# Patient Record
Sex: FEMALE | Race: WHITE | NOT HISPANIC OR LATINO | Employment: OTHER | URBAN - METROPOLITAN AREA
[De-identification: names, ages, dates, MRNs, and addresses within clinical notes are randomized per-mention and may not be internally consistent; named-entity substitution may affect disease eponyms.]

---

## 2023-04-04 ENCOUNTER — OFFICE VISIT (OUTPATIENT)
Dept: PRIMARY CARE | Facility: CLINIC | Age: 78
End: 2023-04-04
Payer: MEDICARE

## 2023-04-04 VITALS
SYSTOLIC BLOOD PRESSURE: 108 MMHG | HEIGHT: 59 IN | BODY MASS INDEX: 29.43 KG/M2 | WEIGHT: 146 LBS | HEART RATE: 56 BPM | TEMPERATURE: 97.8 F | DIASTOLIC BLOOD PRESSURE: 68 MMHG | OXYGEN SATURATION: 98 %

## 2023-04-04 DIAGNOSIS — M17.12 OSTEOARTHRITIS OF LEFT KNEE, UNSPECIFIED OSTEOARTHRITIS TYPE: Primary | ICD-10-CM

## 2023-04-04 PROCEDURE — 1036F TOBACCO NON-USER: CPT | Performed by: FAMILY MEDICINE

## 2023-04-04 PROCEDURE — 99214 OFFICE O/P EST MOD 30 MIN: CPT | Performed by: FAMILY MEDICINE

## 2023-04-04 ASSESSMENT — PATIENT HEALTH QUESTIONNAIRE - PHQ9
SUM OF ALL RESPONSES TO PHQ9 QUESTIONS 1 AND 2: 0
1. LITTLE INTEREST OR PLEASURE IN DOING THINGS: NOT AT ALL
2. FEELING DOWN, DEPRESSED OR HOPELESS: NOT AT ALL

## 2023-04-04 ASSESSMENT — LIFESTYLE VARIABLES
HOW OFTEN DO YOU HAVE A DRINK CONTAINING ALCOHOL: 2-4 TIMES A MONTH
HOW OFTEN DO YOU HAVE A DRINK CONTAINING ALCOHOL: NEVER

## 2023-04-04 ASSESSMENT — ENCOUNTER SYMPTOMS
ARTHRALGIAS: 1
LOSS OF SENSATION IN FEET: 0
DEPRESSION: 0
OCCASIONAL FEELINGS OF UNSTEADINESS: 0

## 2023-04-04 NOTE — PROGRESS NOTES
"Subjective   Patient ID: Leonila Andres is a 77 y.o. female who presents for Follow-up (Pt presents today for medical surgery clearance).    Here for medical clearance for left total knee replacement.         Review of Systems   Musculoskeletal:  Positive for arthralgias.   All other systems reviewed and are negative.      Objective   /68   Pulse 56   Temp 36.6 °C (97.8 °F)   Ht 1.499 m (4' 11\")   Wt 66.2 kg (146 lb)   SpO2 98%   BMI 29.49 kg/m²     Physical Exam  Vitals and nursing note reviewed.   Constitutional:       Appearance: She is obese.   HENT:      Head: Normocephalic and atraumatic.      Nose: Nose normal.      Mouth/Throat:      Mouth: Mucous membranes are moist.      Pharynx: Oropharynx is clear.   Eyes:      Extraocular Movements: Extraocular movements intact.      Conjunctiva/sclera: Conjunctivae normal.      Pupils: Pupils are equal, round, and reactive to light.   Cardiovascular:      Rate and Rhythm: Normal rate and regular rhythm.      Pulses: Normal pulses.   Pulmonary:      Effort: Pulmonary effort is normal.      Breath sounds: Normal breath sounds.   Abdominal:      General: Bowel sounds are normal.      Palpations: Abdomen is soft.   Musculoskeletal:         General: Tenderness present. Normal range of motion.      Cervical back: Normal range of motion and neck supple.   Skin:     General: Skin is warm and dry.      Capillary Refill: Capillary refill takes less than 2 seconds.   Neurological:      General: No focal deficit present.      Mental Status: She is alert.   Psychiatric:         Mood and Affect: Mood normal.         Behavior: Behavior normal.         Thought Content: Thought content normal.         Judgment: Judgment normal.         Assessment/Plan Medically cleared for left total knee replacement surgery  Problem List Items Addressed This Visit    None  Visit Diagnoses       Osteoarthritis of left knee, unspecified osteoarthritis type    -  Primary               "

## 2023-05-16 ENCOUNTER — HOSPITAL ENCOUNTER (OUTPATIENT)
Dept: DATA CONVERSION | Facility: HOSPITAL | Age: 78
End: 2023-05-17
Attending: ORTHOPAEDIC SURGERY | Admitting: ORTHOPAEDIC SURGERY
Payer: COMMERCIAL

## 2023-05-16 DIAGNOSIS — M17.12 UNILATERAL PRIMARY OSTEOARTHRITIS, LEFT KNEE: ICD-10-CM

## 2023-05-16 DIAGNOSIS — E78.5 HYPERLIPIDEMIA, UNSPECIFIED: ICD-10-CM

## 2023-05-17 LAB
ANION GAP IN SER/PLAS: 14 MMOL/L (ref 10–20)
BASOPHILS (10*3/UL) IN BLOOD BY AUTOMATED COUNT: 0.01 X10E9/L (ref 0–0.1)
BASOPHILS/100 LEUKOCYTES IN BLOOD BY AUTOMATED COUNT: 0.1 % (ref 0–2)
CALCIUM (MG/DL) IN SER/PLAS: 8.2 MG/DL (ref 8.6–10.3)
CARBON DIOXIDE, TOTAL (MMOL/L) IN SER/PLAS: 25 MMOL/L (ref 21–32)
CHLORIDE (MMOL/L) IN SER/PLAS: 99 MMOL/L (ref 98–107)
CREATININE (MG/DL) IN SER/PLAS: 0.89 MG/DL (ref 0.5–1.05)
EOSINOPHILS (10*3/UL) IN BLOOD BY AUTOMATED COUNT: 0.01 X10E9/L (ref 0–0.4)
EOSINOPHILS/100 LEUKOCYTES IN BLOOD BY AUTOMATED COUNT: 0.1 % (ref 0–6)
ERYTHROCYTE DISTRIBUTION WIDTH (RATIO) BY AUTOMATED COUNT: 12.8 % (ref 11.5–14.5)
ERYTHROCYTE MEAN CORPUSCULAR HEMOGLOBIN CONCENTRATION (G/DL) BY AUTOMATED: 31.8 G/DL (ref 32–36)
ERYTHROCYTE MEAN CORPUSCULAR VOLUME (FL) BY AUTOMATED COUNT: 90 FL (ref 80–100)
ERYTHROCYTES (10*6/UL) IN BLOOD BY AUTOMATED COUNT: 4.11 X10E12/L (ref 4–5.2)
GFR FEMALE: 67 ML/MIN/1.73M2
GLUCOSE (MG/DL) IN SER/PLAS: 134 MG/DL (ref 74–99)
HEMATOCRIT (%) IN BLOOD BY AUTOMATED COUNT: 37.1 % (ref 36–46)
HEMOGLOBIN (G/DL) IN BLOOD: 11.8 G/DL (ref 12–16)
IMMATURE GRANULOCYTES/100 LEUKOCYTES IN BLOOD BY AUTOMATED COUNT: 0.4 % (ref 0–0.9)
LEUKOCYTES (10*3/UL) IN BLOOD BY AUTOMATED COUNT: 10.8 X10E9/L (ref 4.4–11.3)
LYMPHOCYTES (10*3/UL) IN BLOOD BY AUTOMATED COUNT: 1.45 X10E9/L (ref 0.8–3)
LYMPHOCYTES/100 LEUKOCYTES IN BLOOD BY AUTOMATED COUNT: 13.5 % (ref 13–44)
MONOCYTES (10*3/UL) IN BLOOD BY AUTOMATED COUNT: 0.89 X10E9/L (ref 0.05–0.8)
MONOCYTES/100 LEUKOCYTES IN BLOOD BY AUTOMATED COUNT: 8.3 % (ref 2–10)
NEUTROPHILS (10*3/UL) IN BLOOD BY AUTOMATED COUNT: 8.36 X10E9/L (ref 1.6–5.5)
NEUTROPHILS/100 LEUKOCYTES IN BLOOD BY AUTOMATED COUNT: 77.6 % (ref 40–80)
NRBC (PER 100 WBCS) BY AUTOMATED COUNT: 0 /100 WBC (ref 0–0)
PLATELETS (10*3/UL) IN BLOOD AUTOMATED COUNT: 205 X10E9/L (ref 150–450)
POTASSIUM (MMOL/L) IN SER/PLAS: 4 MMOL/L (ref 3.5–5.3)
SODIUM (MMOL/L) IN SER/PLAS: 134 MMOL/L (ref 136–145)
UREA NITROGEN (MG/DL) IN SER/PLAS: 10 MG/DL (ref 6–23)

## 2023-06-12 LAB
COMPLETE PATHOLOGY REPORT: NORMAL
CONVERTED CLINICAL DIAGNOSIS-HISTORY: NORMAL
CONVERTED FINAL DIAGNOSIS: NORMAL
CONVERTED FINAL REPORT PDF LINK TO COPY AND PASTE: NORMAL
CONVERTED GROSS DESCRIPTION: NORMAL

## 2023-09-07 VITALS — WEIGHT: 145.94 LBS | BODY MASS INDEX: 29.42 KG/M2 | HEIGHT: 59 IN

## 2023-09-30 NOTE — H&P
History & Physical Reviewed:   I have reviewed the History and Physical dated:  05-May-2023   History and Physical reviewed and relevant findings noted. Patient examined to review pertinent physical  findings.: No significant changes   Home Medications Reviewed: no changes noted   Allergies Reviewed: no changes noted       ERAS (Enhanced Recovery After Surgery):  ·  ERAS Patient: no     Consent:   COVID-19 Consent:  ·  COVID-19 Risk Consent Surgeon has reviewed key risks related to the risk of dick COVID-19 and if they contract COVID-19 what the risks are.       Electronic Signatures:  Ruben Ontiveros)  (Signed 16-May-2023 04:31)   Authored: History & Physical Reviewed, ERAS, Consent,  Note Completion      Last Updated: 16-May-2023 04:31 by Ruben Ontiveros)

## 2023-09-30 NOTE — DISCHARGE SUMMARY
Send Summary:   Discharge Summary Providers:  Provider Role Provider Name   · Attending Ruben Ontiveros   · Referring Ruben Ontiveros   · Primary Scottie Marlow       Note Recipients: Scottie Marlow MD       Discharge:    Summary:   Admission Date: .16-May-2023 06:03:00   Discharge Date: 17-May-2023   Attending Physician at Discharge: Ruben Ontiveros MD   Admission Reason: Left knee arthritis, status post  left total knee replacement   Final Discharge Diagnoses: Arthritis of knee, left   Procedures: Date: 16-May-2023 09:42:00  Procedure Name: 1. LEFT TOTAL KNEE REPLACEMENT   Vital Signs:        T   P  R  BP   MAP  SpO2   Value  36.4  80  18  120/68   87  93%  Date/Time 5/17 7:17 5/17 7:17 5/17 7:17 5/17 7:17 5/17 7:17 5/17 7:17  Range  (35.7C - 36.4C )  (72 - 88 )  (18 - 18 )  (105 - 141 )/ (62 - 73 )  (87 - 91 )  (91% - 96% )    Date:            Weight/Scale Type:  Height:   16-May-2023 11:21  66.2  kg         149.8  cm  Physical Exam:    Patient is in no acute distress.  Exam of her left knee reveals she has mild bloody shadow at the bandage.  She has no left calf tenderness.  She has adequate strength  with resisted plantarflexion dorsiflexion of her left foot and ankle.  She has weak quadriceps function which is expected at this time, especially following an adductor canal block.  Hospital Course:    Patient is a 77-year-old female had significant left knee pain secondary to left knee arthritis.  On May 16th 2023 she underwent elective total knee arthroplasty.   She tolerated procedure without any complications.  She was admitted to orthopedic floor afterwards.  She was eval by physical therapy and cleared to be discharged home on postoperative day 1.  She is discharged home on aspirin twice a day for DVT prophylaxis.   She will follow-up with orthopedic surgery in 1 month for her left total knee replacement.      Discharge Information:    and Continuing Care:   Lab Results - Pending:    Surgical Pathology Drawn  at 16-May-2023 08:45:00  Radiology Results - Pending: None   Discharge Instructions:    Activity:           activity as tolerated   with walker.          May shower..            May not drive.            Weight-bearing Instructions: weight-bearing as tolerated.      Nutrition/Diet:           resume normal diet          Encourage Fluids:   Increase your water and fiber intake to help prevent constipation    Wound Care:           Wound Site:   left knee          Change Dressing:   remove dressing in one week          Cover With:   no dressing, leave open to air,  if no drainage noted    Additional Orders:           Additional Instructions:   .  Continue to apply ice to incision 4 to 5 times a day for 20 minutes    Take over the counter stool softener while you are taking pain medications.  Stop stool softener if you experience diarrhea.    Do NOT take any non steroid anti-inflammatory medications (NSAIDS) such as ibuprofen, aleve, or naproxen.    Wear bilateral karyna hose compression stockings to lower legs for next 4 to 6 weeks      Home Care Certification:           Home Care Agency:    [facility] [phone]          Skilled Disciplines Ordered:   RN/LPN,  PT    Home Care Services:           Home Care Skilled Service:   Rehab (PT/OT/SP eval and treat),  wound care    Follow Up Appointments:    Follow-Up Appointment 01:           Physician/Dept/Service:   Dr Ontiveros          Call to Schedule in:   4 weeks          Phone Number:   [phone]    Discharge Medications: Home Medication   Ocuvite oral tablet - 1 tab(s) orally once a day  Tylenol 500 mg oral tablet - 2 tab(s) orally every 8 hours  aspirin 325 mg oral delayed release tablet - 1 tab(s) orally 2 times a day x 30 days  Colace 100 mg oral capsule - 1 cap(s) orally once a day  traMADol 50 mg oral tablet - 1 tab(s) orally every 4 to 6 hours x 7 days as needed for moderate pain G89.18  oxyCODONE 5 mg oral tablet - 1 tab(s) orally every 4 hours x 7 days as  needed for severe incisional pain G89.18  Calcium + D3 - 1 tab(s) oral once a day  simvastatin 40 mg oral tablet - 1 tab(s) oral once a day     PRN Medication   MiraLax oral powder for reconstitution - orally 2 times a day, As Needed for constipation  take dose if you do NOT have bowel movement by Saturday May 20th     DNR Status:   ·  Code Status Code Status order at time of discharge: Full Code       Electronic Signatures:  Ruben Ontiveros)  (Signed 17-May-2023 08:20)   Authored: Send Summary, Summary Content, Ongoing Care,  DNR Status, Note Completion      Last Updated: 17-May-2023 08:20 by Ruben Ontiveros)

## 2023-09-30 NOTE — PROGRESS NOTES
Service: Orthopaedics     Subjective Data:   NOEMI HORTON is a 77 year old Female who is Hospital Day # 2 and POD #1 for 1. LEFT TOTAL KNEE REPLACEMENT;    Additional Information:    Patient is postoperative day #1 following a left total knee arthroplasty.  She still has moderate pain.  But it is under better control this morning.  She denies  numbness and paresthesias in her left lower extremity.  She denies any calf pain.  She denies chest pain and shortness of breath.  She did have nausea which has resolved.    Objective Data:     Objective Information:      T   P  R  BP   MAP  SpO2   Value  36.4  80  18  120/68   87  93%  Date/Time 5/17 7:17 5/17 7:17 5/17 7:17 5/17 7:17 5/17 7:17 5/17 7:17  Range  (35.7C - 36.4C )  (72 - 88 )  (18 - 18 )  (105 - 141 )/ (62 - 73 )  (87 - 91 )  (91% - 96% )      Pain reported at 5/16 22:00: sleeping    ---- Intake and Output  -----  Mn/Dy/Year Time  Intake   Output  Net  May 17, 2023 6:00 am  0   450  -450  May 16, 2023 10:00 pm  480   500  -20  May 16, 2023 2:00 pm  0   0  0    The Intake and Output Totals for the last 24 hours are:      Intake   Output  Net      480   950  -470    Physical Exam Narrative:  ·  Physical Exam:    Patient is in no acute distress.  She is sitting up at bedside.  Her left knee bandage has mild bloody shadow.  She has no left calf tenderness.  She has adequate  strength with resisted plantarflexion and dorsiflexion of her left foot and ankle.  She does have weakness with left knee extension which is expected.    Recent Lab Results:    Results:        I have reviewed these laboratory results:    Complete Blood Count + Differential  17-May-2023 06:16:00      Result Value    White Blood Cell Count  10.8    Nucleated Erythrocyte Count  0.0    Red Blood Cell Count  4.11    HGB  11.8   L   HCT  37.1    MCV  90    MCHC  31.8   L   PLT  205    RDW-CV  12.8    Neutrophil %  77.6    Immature Granulocytes %  0.4    Lymphocyte %  13.5    Monocyte %   8.3    Eosinophil %  0.1    Basophil %  0.1    Neutrophil Count  8.36   H   Lymphocyte Count  1.45    Monocyte Count  0.89   H   Eosinophil Count  0.01    Basophil Count  0.01        Assessment and Plan:        Additional Dx:   Arthritis of knee, left: Entered Date: 16-May-2023 09:51    Code Status:  ·  Code Status Full Code     Assessment:    Patient is doing satisfactory postop day 1 following her left total knee replacement.  She will be mobilized with physical therapy.  She will likely be discharged  home later today on aspirin twice a day for DVT prophylaxis.  She will follow-up with orthopedic surgery in 1 month.      Electronic Signatures:  Ruben Ontiveros)  (Signed 17-May-2023 07:59)   Authored: Service, Subjective Data, Objective Data, Assessment  and Plan, Note Completion      Last Updated: 17-May-2023 07:59 by Ruben Ontiveros)

## 2023-10-02 NOTE — OP NOTE
PROCEDURE DETAILS    Preoperative Diagnosis:  Osteoarthritis of left knee, M17.12    Postoperative Diagnosis:  Osteoarthritis of left knee, M17.12    Surgeon: Ruben Ontiveros  Resident/Fellow/Other Assistant: Demarcus Murphy    Procedure:  1. LEFT TOTAL KNEE REPLACEMENT    Anesthesia: No anesthesiologist associated with this case  Estimated Blood Loss: 150mL  Blood Replaced: none  Findings: severe DJD left knee  Specimens(s) Collected: no,     Complications: none  Implants: DePuy Attune TKA  Tourniquet Times: 68 minutes at 300mmHg on left thigh  Patient Returned To/Condition: Stable    Date of Dictation: 16-May-2023  Dictated By: Ruben Ontiveros MD  Dictation Job Number: [id number]                            Attestation:   Note Completion:  Attending Attestation I performed the procedure without a resident         Electronic Signatures:  Ruben Ontiveros)  (Signed 16-May-2023 09:50)   Authored: Post-Operative Note, Chart Review, Note Completion      Last Updated: 16-May-2023 09:50 by Ruben Ontiveros)

## 2023-11-16 ENCOUNTER — OFFICE VISIT (OUTPATIENT)
Dept: PRIMARY CARE | Facility: CLINIC | Age: 78
End: 2023-11-16
Payer: COMMERCIAL

## 2023-11-16 VITALS
WEIGHT: 134 LBS | DIASTOLIC BLOOD PRESSURE: 66 MMHG | OXYGEN SATURATION: 96 % | BODY MASS INDEX: 27.01 KG/M2 | HEART RATE: 63 BPM | SYSTOLIC BLOOD PRESSURE: 104 MMHG | TEMPERATURE: 96.9 F | HEIGHT: 59 IN

## 2023-11-16 DIAGNOSIS — Z00.00 ROUTINE GENERAL MEDICAL EXAMINATION AT HEALTH CARE FACILITY: Primary | ICD-10-CM

## 2023-11-16 DIAGNOSIS — Z00.00 MEDICARE ANNUAL WELLNESS VISIT, SUBSEQUENT: ICD-10-CM

## 2023-11-16 DIAGNOSIS — E78.2 MIXED HYPERLIPIDEMIA: ICD-10-CM

## 2023-11-16 PROCEDURE — 1159F MED LIST DOCD IN RCRD: CPT | Performed by: FAMILY MEDICINE

## 2023-11-16 PROCEDURE — G0439 PPPS, SUBSEQ VISIT: HCPCS | Performed by: FAMILY MEDICINE

## 2023-11-16 PROCEDURE — 1036F TOBACCO NON-USER: CPT | Performed by: FAMILY MEDICINE

## 2023-11-16 PROCEDURE — 1160F RVW MEDS BY RX/DR IN RCRD: CPT | Performed by: FAMILY MEDICINE

## 2023-11-16 PROCEDURE — 1170F FXNL STATUS ASSESSED: CPT | Performed by: FAMILY MEDICINE

## 2023-11-16 RX ORDER — SIMVASTATIN 40 MG/1
40 TABLET, FILM COATED ORAL DAILY
COMMUNITY
End: 2023-11-21 | Stop reason: SDUPTHER

## 2023-11-16 ASSESSMENT — PATIENT HEALTH QUESTIONNAIRE - PHQ9
2. FEELING DOWN, DEPRESSED OR HOPELESS: NOT AT ALL
2. FEELING DOWN, DEPRESSED OR HOPELESS: NOT AT ALL
SUM OF ALL RESPONSES TO PHQ9 QUESTIONS 1 AND 2: 0
SUM OF ALL RESPONSES TO PHQ9 QUESTIONS 1 AND 2: 0
1. LITTLE INTEREST OR PLEASURE IN DOING THINGS: NOT AT ALL
1. LITTLE INTEREST OR PLEASURE IN DOING THINGS: NOT AT ALL

## 2023-11-16 ASSESSMENT — COLUMBIA-SUICIDE SEVERITY RATING SCALE - C-SSRS
1. IN THE PAST MONTH, HAVE YOU WISHED YOU WERE DEAD OR WISHED YOU COULD GO TO SLEEP AND NOT WAKE UP?: NO
2. HAVE YOU ACTUALLY HAD ANY THOUGHTS OF KILLING YOURSELF?: NO
6. HAVE YOU EVER DONE ANYTHING, STARTED TO DO ANYTHING, OR PREPARED TO DO ANYTHING TO END YOUR LIFE?: NO

## 2023-11-16 ASSESSMENT — ACTIVITIES OF DAILY LIVING (ADL)
DOING_HOUSEWORK: INDEPENDENT
BATHING: INDEPENDENT
GROCERY_SHOPPING: INDEPENDENT
DRESSING: INDEPENDENT
TAKING_MEDICATION: INDEPENDENT
MANAGING_FINANCES: INDEPENDENT

## 2023-11-16 NOTE — PROGRESS NOTES
"Subjective   Patient ID: Leonila Andres is a 77 y.o. female who presents for Medicare Annual Wellness Visit Subsequent (Pt is here for medicare wellness exam ).    Medicare wellness exam, subsequent. Screening tests ordered.         Review of Systems   All other systems reviewed and are negative.      Objective   /66   Pulse 63   Temp 36.1 °C (96.9 °F)   Ht 1.499 m (4' 11\")   Wt 60.8 kg (134 lb)   SpO2 96%   BMI 27.06 kg/m²     Physical Exam    Assessment/Plan   Problem List Items Addressed This Visit    None  Visit Diagnoses         Codes    Routine general medical examination at health care facility    -  Primary Z00.00    Mixed hyperlipidemia     E78.2    Relevant Orders    Lipid panel    CBC and Auto Differential    Medicare annual wellness visit, subsequent     Z00.00    Relevant Orders    Comprehensive metabolic panel               "

## 2023-11-20 ENCOUNTER — LAB (OUTPATIENT)
Dept: LAB | Facility: LAB | Age: 78
End: 2023-11-20
Payer: COMMERCIAL

## 2023-11-20 DIAGNOSIS — E78.2 MIXED HYPERLIPIDEMIA: ICD-10-CM

## 2023-11-20 DIAGNOSIS — Z00.00 MEDICARE ANNUAL WELLNESS VISIT, SUBSEQUENT: ICD-10-CM

## 2023-11-20 LAB
ALBUMIN SERPL BCP-MCNC: 4.2 G/DL (ref 3.4–5)
ALP SERPL-CCNC: 87 U/L (ref 33–136)
ALT SERPL W P-5'-P-CCNC: 11 U/L (ref 7–45)
ANION GAP SERPL CALC-SCNC: 12 MMOL/L (ref 10–20)
AST SERPL W P-5'-P-CCNC: 13 U/L (ref 9–39)
BASOPHILS # BLD AUTO: 0.04 X10*3/UL (ref 0–0.1)
BASOPHILS NFR BLD AUTO: 0.8 %
BILIRUB SERPL-MCNC: 0.6 MG/DL (ref 0–1.2)
BUN SERPL-MCNC: 17 MG/DL (ref 6–23)
CALCIUM SERPL-MCNC: 9.2 MG/DL (ref 8.6–10.3)
CHLORIDE SERPL-SCNC: 105 MMOL/L (ref 98–107)
CHOLEST SERPL-MCNC: 265 MG/DL (ref 0–199)
CHOLESTEROL/HDL RATIO: 4
CO2 SERPL-SCNC: 30 MMOL/L (ref 21–32)
CREAT SERPL-MCNC: 0.88 MG/DL (ref 0.5–1.05)
EOSINOPHIL # BLD AUTO: 0.32 X10*3/UL (ref 0–0.4)
EOSINOPHIL NFR BLD AUTO: 6.2 %
ERYTHROCYTE [DISTWIDTH] IN BLOOD BY AUTOMATED COUNT: 13.7 % (ref 11.5–14.5)
GFR SERPL CREATININE-BSD FRML MDRD: 69 ML/MIN/1.73M*2
GLUCOSE SERPL-MCNC: 89 MG/DL (ref 74–99)
HCT VFR BLD AUTO: 43.3 % (ref 36–46)
HDLC SERPL-MCNC: 65.6 MG/DL
HGB BLD-MCNC: 13.7 G/DL (ref 12–16)
IMM GRANULOCYTES # BLD AUTO: 0.02 X10*3/UL (ref 0–0.5)
IMM GRANULOCYTES NFR BLD AUTO: 0.4 % (ref 0–0.9)
LDLC SERPL CALC-MCNC: 171 MG/DL
LYMPHOCYTES # BLD AUTO: 1.47 X10*3/UL (ref 0.8–3)
LYMPHOCYTES NFR BLD AUTO: 28.5 %
MCH RBC QN AUTO: 28.9 PG (ref 26–34)
MCHC RBC AUTO-ENTMCNC: 31.6 G/DL (ref 32–36)
MCV RBC AUTO: 91 FL (ref 80–100)
MONOCYTES # BLD AUTO: 0.46 X10*3/UL (ref 0.05–0.8)
MONOCYTES NFR BLD AUTO: 8.9 %
NEUTROPHILS # BLD AUTO: 2.84 X10*3/UL (ref 1.6–5.5)
NEUTROPHILS NFR BLD AUTO: 55.2 %
NON HDL CHOLESTEROL: 199 MG/DL (ref 0–149)
NRBC BLD-RTO: 0 /100 WBCS (ref 0–0)
PLATELET # BLD AUTO: 221 X10*3/UL (ref 150–450)
POTASSIUM SERPL-SCNC: 4.5 MMOL/L (ref 3.5–5.3)
PROT SERPL-MCNC: 6.6 G/DL (ref 6.4–8.2)
RBC # BLD AUTO: 4.74 X10*6/UL (ref 4–5.2)
SODIUM SERPL-SCNC: 142 MMOL/L (ref 136–145)
TRIGL SERPL-MCNC: 144 MG/DL (ref 0–149)
VLDL: 29 MG/DL (ref 0–40)
WBC # BLD AUTO: 5.2 X10*3/UL (ref 4.4–11.3)

## 2023-11-20 PROCEDURE — 36415 COLL VENOUS BLD VENIPUNCTURE: CPT

## 2023-11-20 PROCEDURE — 85025 COMPLETE CBC W/AUTO DIFF WBC: CPT

## 2023-11-20 PROCEDURE — 80053 COMPREHEN METABOLIC PANEL: CPT

## 2023-11-20 PROCEDURE — 80061 LIPID PANEL: CPT

## 2023-11-21 DIAGNOSIS — E78.5 HYPERLIPIDEMIA, UNSPECIFIED HYPERLIPIDEMIA TYPE: Primary | ICD-10-CM

## 2023-11-21 RX ORDER — SIMVASTATIN 40 MG/1
40 TABLET, FILM COATED ORAL DAILY
Qty: 90 TABLET | Refills: 1 | Status: SHIPPED | OUTPATIENT
Start: 2023-11-21 | End: 2024-04-25 | Stop reason: SDUPTHER

## 2024-04-17 ENCOUNTER — HOSPITAL ENCOUNTER (EMERGENCY)
Facility: HOSPITAL | Age: 79
Discharge: HOME | End: 2024-04-17
Attending: STUDENT IN AN ORGANIZED HEALTH CARE EDUCATION/TRAINING PROGRAM
Payer: COMMERCIAL

## 2024-04-17 ENCOUNTER — APPOINTMENT (OUTPATIENT)
Dept: RADIOLOGY | Facility: HOSPITAL | Age: 79
End: 2024-04-17
Payer: COMMERCIAL

## 2024-04-17 ENCOUNTER — APPOINTMENT (OUTPATIENT)
Dept: CARDIOLOGY | Facility: HOSPITAL | Age: 79
End: 2024-04-17
Payer: COMMERCIAL

## 2024-04-17 VITALS
WEIGHT: 140 LBS | OXYGEN SATURATION: 100 % | HEIGHT: 59 IN | RESPIRATION RATE: 16 BRPM | BODY MASS INDEX: 28.22 KG/M2 | DIASTOLIC BLOOD PRESSURE: 60 MMHG | SYSTOLIC BLOOD PRESSURE: 112 MMHG | TEMPERATURE: 97 F | HEART RATE: 76 BPM

## 2024-04-17 DIAGNOSIS — R20.2 PARESTHESIA: Primary | ICD-10-CM

## 2024-04-17 LAB
ALBUMIN SERPL BCP-MCNC: 3.8 G/DL (ref 3.4–5)
ALP SERPL-CCNC: 97 U/L (ref 33–136)
ALT SERPL W P-5'-P-CCNC: 8 U/L (ref 7–45)
ANION GAP SERPL CALC-SCNC: 15 MMOL/L (ref 10–20)
APTT PPP: 32 SECONDS (ref 27–38)
AST SERPL W P-5'-P-CCNC: 13 U/L (ref 9–39)
BASOPHILS # BLD AUTO: 0.04 X10*3/UL (ref 0–0.1)
BASOPHILS NFR BLD AUTO: 0.4 %
BILIRUB SERPL-MCNC: 0.6 MG/DL (ref 0–1.2)
BUN SERPL-MCNC: 20 MG/DL (ref 6–23)
CALCIUM SERPL-MCNC: 9.4 MG/DL (ref 8.6–10.3)
CARDIAC TROPONIN I PNL SERPL HS: 3 NG/L (ref 0–13)
CHLORIDE SERPL-SCNC: 104 MMOL/L (ref 98–107)
CO2 SERPL-SCNC: 27 MMOL/L (ref 21–32)
CREAT SERPL-MCNC: 0.78 MG/DL (ref 0.5–1.05)
EGFRCR SERPLBLD CKD-EPI 2021: 79 ML/MIN/1.73M*2
EOSINOPHIL # BLD AUTO: 0.16 X10*3/UL (ref 0–0.4)
EOSINOPHIL NFR BLD AUTO: 1.8 %
ERYTHROCYTE [DISTWIDTH] IN BLOOD BY AUTOMATED COUNT: 12.7 % (ref 11.5–14.5)
GLUCOSE SERPL-MCNC: 93 MG/DL (ref 74–99)
HCT VFR BLD AUTO: 42.4 % (ref 36–46)
HGB BLD-MCNC: 13.8 G/DL (ref 12–16)
IMM GRANULOCYTES # BLD AUTO: 0.05 X10*3/UL (ref 0–0.5)
IMM GRANULOCYTES NFR BLD AUTO: 0.5 % (ref 0–0.9)
INR PPP: 1 (ref 0.9–1.1)
LYMPHOCYTES # BLD AUTO: 1.34 X10*3/UL (ref 0.8–3)
LYMPHOCYTES NFR BLD AUTO: 14.7 %
MCH RBC QN AUTO: 28 PG (ref 26–34)
MCHC RBC AUTO-ENTMCNC: 32.5 G/DL (ref 32–36)
MCV RBC AUTO: 86 FL (ref 80–100)
MONOCYTES # BLD AUTO: 0.59 X10*3/UL (ref 0.05–0.8)
MONOCYTES NFR BLD AUTO: 6.5 %
NEUTROPHILS # BLD AUTO: 6.95 X10*3/UL (ref 1.6–5.5)
NEUTROPHILS NFR BLD AUTO: 76.1 %
NRBC BLD-RTO: 0 /100 WBCS (ref 0–0)
PLATELET # BLD AUTO: 386 X10*3/UL (ref 150–450)
POTASSIUM SERPL-SCNC: 4.7 MMOL/L (ref 3.5–5.3)
PROT SERPL-MCNC: 6.8 G/DL (ref 6.4–8.2)
PROTHROMBIN TIME: 11.5 SECONDS (ref 9.8–12.8)
RBC # BLD AUTO: 4.92 X10*6/UL (ref 4–5.2)
SODIUM SERPL-SCNC: 141 MMOL/L (ref 136–145)
WBC # BLD AUTO: 9.1 X10*3/UL (ref 4.4–11.3)

## 2024-04-17 PROCEDURE — 93005 ELECTROCARDIOGRAM TRACING: CPT

## 2024-04-17 PROCEDURE — 72125 CT NECK SPINE W/O DYE: CPT | Performed by: RADIOLOGY

## 2024-04-17 PROCEDURE — 99285 EMERGENCY DEPT VISIT HI MDM: CPT | Mod: 25

## 2024-04-17 PROCEDURE — 80053 COMPREHEN METABOLIC PANEL: CPT | Performed by: PHYSICIAN ASSISTANT

## 2024-04-17 PROCEDURE — 70450 CT HEAD/BRAIN W/O DYE: CPT

## 2024-04-17 PROCEDURE — 36415 COLL VENOUS BLD VENIPUNCTURE: CPT | Performed by: PHYSICIAN ASSISTANT

## 2024-04-17 PROCEDURE — 70450 CT HEAD/BRAIN W/O DYE: CPT | Performed by: RADIOLOGY

## 2024-04-17 PROCEDURE — 85610 PROTHROMBIN TIME: CPT | Performed by: PHYSICIAN ASSISTANT

## 2024-04-17 PROCEDURE — 84484 ASSAY OF TROPONIN QUANT: CPT | Performed by: PHYSICIAN ASSISTANT

## 2024-04-17 PROCEDURE — 85730 THROMBOPLASTIN TIME PARTIAL: CPT | Performed by: PHYSICIAN ASSISTANT

## 2024-04-17 PROCEDURE — 85025 COMPLETE CBC W/AUTO DIFF WBC: CPT | Performed by: PHYSICIAN ASSISTANT

## 2024-04-17 PROCEDURE — 72125 CT NECK SPINE W/O DYE: CPT

## 2024-04-17 RX ORDER — METHYLPREDNISOLONE 4 MG/1
TABLET ORAL
Qty: 21 TABLET | Refills: 0 | Status: SHIPPED | OUTPATIENT
Start: 2024-04-17 | End: 2024-04-25 | Stop reason: ALTCHOICE

## 2024-04-17 ASSESSMENT — PAIN - FUNCTIONAL ASSESSMENT: PAIN_FUNCTIONAL_ASSESSMENT: 0-10

## 2024-04-17 ASSESSMENT — PAIN SCALES - GENERAL
PAINLEVEL_OUTOF10: 1
PAINLEVEL_OUTOF10: 1

## 2024-04-17 ASSESSMENT — LIFESTYLE VARIABLES
EVER HAD A DRINK FIRST THING IN THE MORNING TO STEADY YOUR NERVES TO GET RID OF A HANGOVER: NO
HAVE YOU EVER FELT YOU SHOULD CUT DOWN ON YOUR DRINKING: NO
HAVE PEOPLE ANNOYED YOU BY CRITICIZING YOUR DRINKING: NO
EVER FELT BAD OR GUILTY ABOUT YOUR DRINKING: NO
TOTAL SCORE: 0

## 2024-04-17 ASSESSMENT — COLUMBIA-SUICIDE SEVERITY RATING SCALE - C-SSRS
1. IN THE PAST MONTH, HAVE YOU WISHED YOU WERE DEAD OR WISHED YOU COULD GO TO SLEEP AND NOT WAKE UP?: NO
6. HAVE YOU EVER DONE ANYTHING, STARTED TO DO ANYTHING, OR PREPARED TO DO ANYTHING TO END YOUR LIFE?: NO
2. HAVE YOU ACTUALLY HAD ANY THOUGHTS OF KILLING YOURSELF?: NO

## 2024-04-17 ASSESSMENT — PAIN DESCRIPTION - ORIENTATION: ORIENTATION_2: MID

## 2024-04-17 ASSESSMENT — PAIN DESCRIPTION - LOCATION
LOCATION: FINGER (COMMENT WHICH ONE)
LOCATION_2: NECK

## 2024-04-17 ASSESSMENT — PAIN DESCRIPTION - DESCRIPTORS
DESCRIPTORS: SHOOTING;SPASM
DESCRIPTORS_2: ACHING

## 2024-04-17 NOTE — ED TRIAGE NOTES
Pt. States approx 2 week hx of discomfort (neck down to abdomen) and numbness / pain in left hand and 3 middle fingers for approx 1 wk.  Pt co of chronic systemic pain & arthritis like symptoms.

## 2024-04-17 NOTE — ED PROVIDER NOTES
"EMERGENCY MEDICINE EVALUATION NOTE    History of Present Illness     Chief Complaint:   Chief Complaint   Patient presents with    Numbness     Pt. States approx 2 week hx of discomfort (neck down to abdomen) and numbness / pain in left hand and 3 middle fingers for approx 1 wk.  Pt co of chronic systemic pain & arthritis like symptoms.       HPI: Leonila Andres is a 78 y.o. female presents with a chief complaint of multiple medical complaints.  Patient initially evaluated in triage due to concern for stroke alert but it being 7 days that is different from calling stroke alert.  Patient reports that she has pain from her left side of her neck down her left arm to her hand.  She reports that she has tingling and paresthesias in her thumb index and middle finger of her left arm.  Patient denies any associated chest pain or shortness of breath.  Patient states that the pain has not actually started her neck it starts more near her shoulder.  Patient denies any weakness in the upper extremity.  Patient denies any numbness or weakness anywhere else.  Patient reports that she has diffuse pain throughout her body also occasionally what she is describing as a \"systemic arthritis\".  Patient denies any history of any strokes.  Patient did not take anything at home for her symptoms.  Patient denies any medication allergies.    Previous History     Past Medical History:   Diagnosis Date    Personal history of other diseases of the musculoskeletal system and connective tissue     History of arthritis    Personal history of other endocrine, nutritional and metabolic disease     History of obesity    Personal history of other infectious and parasitic diseases     History of measles     Past Surgical History:   Procedure Laterality Date    OTHER SURGICAL HISTORY  01/22/2019    Hysterectomy    OTHER SURGICAL HISTORY  01/22/2019    Ankle surgery    OTHER SURGICAL HISTORY  01/22/2019    Tonsillectomy with adenoidectomy     Social " History     Tobacco Use    Smoking status: Never    Smokeless tobacco: Never   Substance Use Topics    Alcohol use: Never    Drug use: Never     No family history on file.  No Known Allergies  Current Outpatient Medications   Medication Instructions    methylPREDNISolone (Medrol Dospak) 4 mg tablets Follow schedule on package instructions    simvastatin (ZOCOR) 40 mg, oral, Daily       Physical Exam     Appearance: Alert, oriented , cooperative,  in no acute distress.      Skin: Intact,  dry skin, no lesions, rash, petechiae or purpura.      Eyes: PERRLA, EOMs intact,  Conjunctiva pink      ENT: Hearing grossly intact. Pharynx clear, uvula midline.      Neck: Supple. Trachea at midline.      Pulmonary: Clear bilaterally. No rales, rhonchi or wheezing. No accessory muscle use or stridor.     Cardiac: Normal rate and rhythm without murmur     Abdomen: Soft, nontender, active bowel sounds.     Musculoskeletal: Full range of motion.   strength in right upper extremities.  No sensory deficit noted on examination.     Neurological:Cranial nerves II through XII are grossly intact, normal sensation, no weakness, no focal findings identified.  No facial droop.     Results     Labs Reviewed   CBC WITH AUTO DIFFERENTIAL - Abnormal       Result Value    WBC 9.1      nRBC 0.0      RBC 4.92      Hemoglobin 13.8      Hematocrit 42.4      MCV 86      MCH 28.0      MCHC 32.5      RDW 12.7      Platelets 386      Neutrophils % 76.1      Immature Granulocytes %, Automated 0.5      Lymphocytes % 14.7      Monocytes % 6.5      Eosinophils % 1.8      Basophils % 0.4      Neutrophils Absolute 6.95 (*)     Immature Granulocytes Absolute, Automated 0.05      Lymphocytes Absolute 1.34      Monocytes Absolute 0.59      Eosinophils Absolute 0.16      Basophils Absolute 0.04     COMPREHENSIVE METABOLIC PANEL - Normal    Glucose 93      Sodium 141      Potassium 4.7      Chloride 104      Bicarbonate 27      Anion Gap 15      Urea Nitrogen  20      Creatinine 0.78      eGFR 79      Calcium 9.4      Albumin 3.8      Alkaline Phosphatase 97      Total Protein 6.8      AST 13      Bilirubin, Total 0.6      ALT 8     PROTIME-INR - Normal    Protime 11.5      INR 1.0     APTT - Normal    aPTT 32      Narrative:     The APTT is no longer used for monitoring Unfractionated Heparin Therapy. For monitoring Heparin Therapy, use the Heparin Assay.   TROPONIN I, HIGH SENSITIVITY - Normal    Troponin I, High Sensitivity 3      Narrative:     Less than 99th percentile of normal range cutoff-  Female and children under 18 years old <14 ng/L; Male <21 ng/L: Negative  Repeat testing should be performed if clinically indicated.     Female and children under 18 years old 14-50 ng/L; Male 21-50 ng/L:  Consistent with possible cardiac damage and possible increased clinical   risk. Serial measurements may help to assess extent of myocardial damage.     >50 ng/L: Consistent with cardiac damage, increased clinical risk and  myocardial infarction. Serial measurements may help assess extent of   myocardial damage.      NOTE: Children less than 1 year old may have higher baseline troponin   levels and results should be interpreted in conjunction with the overall   clinical context.     NOTE: Troponin I testing is performed using a different   testing methodology at [facility] than at other   [facility]. Direct result comparisons should only   be made within the same method.     CT head wo IV contrast   Final Result   CT HEAD:   No acute intracranial abnormality or calvarial fracture.   Senescent changes        CT CERVICAL SPINE:   No acute fracture or traumatic malalignment of the cervical spine.   Multilevel degenerative disc disease. If persistent concern, consider   MRI        Signed by: Jadiel Lopez 4/17/2024 12:35 PM   Dictation workstation:   [id number]      CT cervical spine wo IV contrast   Final Result   CT HEAD:   No acute intracranial  "abnormality or calvarial fracture.   Senescent changes        CT CERVICAL SPINE:   No acute fracture or traumatic malalignment of the cervical spine.   Multilevel degenerative disc disease. If persistent concern, consider   MRI        Signed by: Jadiel Lopez 4/17/2024 12:35 PM   Dictation workstation:   [id number]            ED Course & Medical Decision Making   Medications - No data to display  Heart Rate:  [76]   Temperature:  [36.1 °C (97 °F)]   Respirations:  [16]   BP: (112-115)/(57-60)   Height:  [149.9 cm (4' 11\")]   Weight:  [63.5 kg (140 lb)]   Pulse Ox:  [98 %-100 %]    ED Course as of 04/17/24 1438   Wed Apr 17, 2024   1314 Updated the patient on the results.  Patient did not show any acute abnormalities.  Patient's CT imaging of the head and neck did not show any significant abnormalities.  Patient's blood work was within normal limits with normal electrolytes as well as normal troponin.  Patient has equal  strength on examination and did not have any significant decreased sensation just a subjective decrease in sensation.  Plan of care was discussed with patient.  Patient will be given a Medrol Dosepak and will be instructed to follow-up with neurology.  Patient be given a referral to neurology.  She was instructed to return here immediately with any worsening symptoms and to follow-up with her primary care provider 1 to 2 days.  [CJ]      ED Course User Index  [CJ] Elie Schaefer PA-C         Diagnoses as of 04/17/24 1438   Paresthesia       Procedures   Procedures    Diagnosis     1. Paresthesia        Disposition   Discharged     ED Prescriptions       Medication Sig Dispense Start Date End Date Auth. Provider    methylPREDNISolone (Medrol Dospak) 4 mg tablets Follow schedule on package instructions 21 tablet 4/17/2024 4/24/2024 Elie Schaefer PA-C            Disclaimer: This note was dictated by speech recognition. Minor errors in transcription may be present. Please call if questions.     "   Elie Schaefer PA-C  04/17/24 [id number]

## 2024-04-17 NOTE — ED TRIAGE NOTES
Pt also states approx 2 week hx of rash on back of right shoulder.  Pt states no new RX, changes to eating habits or environmental exposures.

## 2024-04-18 LAB
ATRIAL RATE: 74 BPM
P AXIS: 7 DEGREES
PR INTERVAL: 126 MS
Q ONSET: 253 MS
QRS COUNT: 12 BEATS
QRS DURATION: 79 MS
QT INTERVAL: 378 MS
QTC CALCULATION(BAZETT): 428 MS
QTC FREDERICIA: 410 MS
R AXIS: 35 DEGREES
T AXIS: -8 DEGREES
T OFFSET: 442 MS
VENTRICULAR RATE: 77 BPM

## 2024-04-22 ENCOUNTER — PATIENT OUTREACH (OUTPATIENT)
Dept: CARE COORDINATION | Facility: CLINIC | Age: 79
End: 2024-04-22
Payer: COMMERCIAL

## 2024-04-22 NOTE — PROGRESS NOTES
Outreach call to patient to check in to support smooth transition of care.  Will continue to monitor through transition period.

## 2024-04-25 ENCOUNTER — OFFICE VISIT (OUTPATIENT)
Dept: PRIMARY CARE | Facility: CLINIC | Age: 79
End: 2024-04-25
Payer: COMMERCIAL

## 2024-04-25 VITALS
HEIGHT: 59 IN | WEIGHT: 138 LBS | BODY MASS INDEX: 27.82 KG/M2 | TEMPERATURE: 97.2 F | SYSTOLIC BLOOD PRESSURE: 109 MMHG | HEART RATE: 82 BPM | DIASTOLIC BLOOD PRESSURE: 65 MMHG | OXYGEN SATURATION: 98 %

## 2024-04-25 DIAGNOSIS — E78.2 MIXED HYPERLIPIDEMIA: ICD-10-CM

## 2024-04-25 DIAGNOSIS — B02.30 HERPES ZOSTER WITH OPHTHALMIC COMPLICATION, UNSPECIFIED HERPES ZOSTER EYE DISEASE: ICD-10-CM

## 2024-04-25 DIAGNOSIS — D72.119 HYPEREOSINOPHILIC SYNDROME, UNSPECIFIED TYPE: ICD-10-CM

## 2024-04-25 DIAGNOSIS — D72.829 LEUKOCYTOSIS, UNSPECIFIED TYPE: ICD-10-CM

## 2024-04-25 DIAGNOSIS — E78.5 HYPERLIPIDEMIA, UNSPECIFIED HYPERLIPIDEMIA TYPE: Primary | ICD-10-CM

## 2024-04-25 PROCEDURE — 1159F MED LIST DOCD IN RCRD: CPT | Performed by: FAMILY MEDICINE

## 2024-04-25 PROCEDURE — 1160F RVW MEDS BY RX/DR IN RCRD: CPT | Performed by: FAMILY MEDICINE

## 2024-04-25 PROCEDURE — 99214 OFFICE O/P EST MOD 30 MIN: CPT | Performed by: FAMILY MEDICINE

## 2024-04-25 PROCEDURE — 1157F ADVNC CARE PLAN IN RCRD: CPT | Performed by: FAMILY MEDICINE

## 2024-04-25 PROCEDURE — 1036F TOBACCO NON-USER: CPT | Performed by: FAMILY MEDICINE

## 2024-04-25 RX ORDER — SIMVASTATIN 40 MG/1
40 TABLET, FILM COATED ORAL DAILY
Qty: 90 TABLET | Refills: 1 | Status: SHIPPED | OUTPATIENT
Start: 2024-04-25 | End: 2024-10-22

## 2024-04-25 RX ORDER — VALACYCLOVIR HYDROCHLORIDE 1 G/1
1000 TABLET, FILM COATED ORAL 3 TIMES DAILY
Qty: 21 TABLET | Refills: 0 | Status: SHIPPED | OUTPATIENT
Start: 2024-04-25 | End: 2024-05-02

## 2024-04-25 ASSESSMENT — PATIENT HEALTH QUESTIONNAIRE - PHQ9
2. FEELING DOWN, DEPRESSED OR HOPELESS: SEVERAL DAYS
SUM OF ALL RESPONSES TO PHQ9 QUESTIONS 1 & 2: 1
10. IF YOU CHECKED OFF ANY PROBLEMS, HOW DIFFICULT HAVE THESE PROBLEMS MADE IT FOR YOU TO DO YOUR WORK, TAKE CARE OF THINGS AT HOME, OR GET ALONG WITH OTHER PEOPLE: NOT DIFFICULT AT ALL
1. LITTLE INTEREST OR PLEASURE IN DOING THINGS: NOT AT ALL

## 2024-04-25 ASSESSMENT — COLUMBIA-SUICIDE SEVERITY RATING SCALE - C-SSRS
6. HAVE YOU EVER DONE ANYTHING, STARTED TO DO ANYTHING, OR PREPARED TO DO ANYTHING TO END YOUR LIFE?: NO
1. IN THE PAST MONTH, HAVE YOU WISHED YOU WERE DEAD OR WISHED YOU COULD GO TO SLEEP AND NOT WAKE UP?: NO
2. HAVE YOU ACTUALLY HAD ANY THOUGHTS OF KILLING YOURSELF?: NO

## 2024-04-25 NOTE — PROGRESS NOTES
"Subjective   Patient ID: Leonila Andres is a 78 y.o. female who presents for Follow-up (Follow up ER visit ([facility]) unable to bend left hand middle finger x 2 weeks  also pain starting from the neck down to the feet x 3 weeks ).    Right shoulder shingles. Went to [facility] ER, for left middle finger numbness, rx prednisone, improved.  High white count in ER. HLD.         Review of Systems    Objective   /65   Pulse 82   Temp 36.2 °C (97.2 °F)   Ht 1.499 m (4' 11\")   Wt 62.6 kg (138 lb)   SpO2 98%   BMI 27.87 kg/m²     Physical Exam    Assessment/Plan          "

## 2024-05-02 PROBLEM — M17.12 ARTHRITIS OF LEFT KNEE: Status: ACTIVE | Noted: 2024-05-02

## 2024-05-06 ENCOUNTER — OFFICE VISIT (OUTPATIENT)
Dept: ORTHOPEDIC SURGERY | Facility: CLINIC | Age: 79
End: 2024-05-06
Payer: COMMERCIAL

## 2024-05-06 ENCOUNTER — HOSPITAL ENCOUNTER (OUTPATIENT)
Dept: RADIOLOGY | Facility: CLINIC | Age: 79
Discharge: HOME | End: 2024-05-06
Payer: COMMERCIAL

## 2024-05-06 DIAGNOSIS — M17.12 ARTHRITIS OF LEFT KNEE: Primary | ICD-10-CM

## 2024-05-06 DIAGNOSIS — M17.12 ARTHRITIS OF LEFT KNEE: ICD-10-CM

## 2024-05-06 PROCEDURE — 73560 X-RAY EXAM OF KNEE 1 OR 2: CPT | Mod: LT

## 2024-05-06 PROCEDURE — 73560 X-RAY EXAM OF KNEE 1 OR 2: CPT | Mod: LEFT SIDE | Performed by: RADIOLOGY

## 2024-05-06 PROCEDURE — 1159F MED LIST DOCD IN RCRD: CPT | Performed by: ORTHOPAEDIC SURGERY

## 2024-05-06 PROCEDURE — 1160F RVW MEDS BY RX/DR IN RCRD: CPT | Performed by: ORTHOPAEDIC SURGERY

## 2024-05-06 PROCEDURE — 99213 OFFICE O/P EST LOW 20 MIN: CPT | Performed by: ORTHOPAEDIC SURGERY

## 2024-05-06 PROCEDURE — 1157F ADVNC CARE PLAN IN RCRD: CPT | Performed by: ORTHOPAEDIC SURGERY

## 2024-05-06 NOTE — PROGRESS NOTES
Subjective    Patient ID: Leonila Andres is a 78 y.o. female.    Chief Complaint: Follow-up of the Left Knee (F/U LT TKA/DOS 5/16/23 )     Last Surgery: No surgery found  Last Surgery Date: No surgery found    HPI  Patient comes in for approximately 1 year follow-up after her left total knee replacement.  She has had no issues.  She has been performing her activities is much as she can tolerate.  She denies any pain in her left leg.  Objective   Ortho Exam  Patient is in no acute distress.  She walks with no evidence of an antalgic gait.  Her left knee incision is well-healed.  There is no evidence of any infection.  There is no swelling.  Range of motion is 0 to greater than 120 degrees.  The knee is stable to varus and valgus stress testing.    Image Results:  X-rays of her left knee were personally reviewed today.  They show adequate alignment of the prosthesis.  There is no evidence of any loosening, fracture or dislocation.    Assessment/Plan   Encounter Diagnoses:  Arthritis of left knee    Orders Placed This Encounter    XR knee left 1-2 views     Patient is doing satisfactory 1 year status post a left total knee replacement.  She will follow-up as her symptoms dictate.   [Dear  ___] : Dear  [unfilled], [Courtesy Letter:] : I had the pleasure of seeing your patient, [unfilled], in my office today. [Please see my note below.] : Please see my note below. [Sincerely,] : Sincerely, [[email or web address]] : Nav Garza MD FACS

## 2024-05-06 NOTE — OP NOTE
SURGEON:  Ruben Ontiveros MD    PREOPERATIVE DIAGNOSIS:    End-stage degenerative joint disease, left knee.    POSTOPERATIVE DIAGNOSIS:    End-stage degenerative joint disease, left knee.    PROCEDURE:    Left total knee arthroplasty.    ASSISTANT:    Demarcus Murphy SA.    ANESTHESIA:    Intravenous sedation with a spinal anesthetic and left femoral nerve  block in adductor canal.     ESTIMATED BLOOD LOSS:    150 mL.    COMPLICATIONS:    None.    TOURNIQUET TIME:    68 minutes at 300 mmHg.    COMPONENTS UTILIZED:    A DePuy Attune size 4 standard PS distal femur, a DePuy Attune size 3  stemmed tibia, a DePuy Attune 29 mm all poly patella, and a DePuy  Attune size 7 mm PS articular surface.     INDICATIONS:    Patient is a 77-year-old female with left knee pain and limitations  in activities of daily living secondary to degenerative joint disease  of her left knee.  She had tried and failed conservative management.  We then discussed surgical treatment options including a left total  knee arthroplasty.  I explained the risks, benefits, and alternatives  of this procedure.  I explained that the risks of the procedure  include, but are not limited to, infection; iatrogenic fracture;  damage to nerves, tendons, and blood vessels; hardware failure;  postoperative pain and stiffness; postoperative DVT and pulmonary  embolus, as well as risks associated with anesthesia.  The patient  voiced understanding and informed consent was obtained.     DESCRIPTION OF PROCEDURE:    The patient was properly identified in the preoperative waiting area  and her left knee was marked as site of surgery.  The patient  received Ancef intravenously.  She also received a left adductor  canal femoral nerve block.  The patient was taken back to the  operating room suite and placed supine on the OR table.  She also  then received a spinal anesthetic.  The patient had a nonsterile  tourniquet applied to patient's left thigh.  After  intravenous  sedation was administered, the patient's left lower extremity was  then prepped and draped in sterile fashion.  A preoperative  verification time-out was then taken.  At this point, the patient's  left lower extremity was exsanguinated with Esmarch bandage and  tourniquet inflated to 300 mmHg.  I made approximately an 8 inch  longitudinal incision on the anterior aspect of patient's left knee.  Sharp dissection was carried through skin and subcutaneous tissue.  Electrocautery was used to achieve hemostasis.  I performed a medial  parapatellar arthrotomy.  I everted the patella and excised the  infrapatellar fat pad as well as the anterior portions of medial and  lateral meniscus.  I performed a medial release with a curved  osteotome.  I used a sagittal saw to remove the eburnated cartilage  off the patella.  Knee was then placed in a flexed position and I  began preparing the distal femur.  I drilled for and placed my distal  femoral cutting guide.  I made my distal femoral cut in 5 degrees of  valgus.  I measured for a size 4 distal femur.  I placed in my size 4  distal femoral cutting guide.  I made my anterior, posterior and  anterior, and posterior chamfer cuts with a sagittal saw.  Excess  bone was removed.  I then made my PS notch cut with a reciprocating  saw.     I turned my attention to the proximal tibia.  I used an  extramedullary cutting guide in order to do so.  I used a stylus to  take 2 mm off with the medial surface as my guidances at the most  arthritic change to it.  I made my cut through the proximal tibia  with a sagittal saw.  All bone was removed.  I sized my tibia to a  size 3.  I placed on my trial tibial plate and then reamed for the  stem.  I placed on my trial size 4 femoral component.  I drilled for  the 2 pegs.  I began trialing with initially a size 5 mm PS surface,  but had best fit when I increased to a 7 mm PS articular surface.  I  had good patellar tracking using the  no thumbs technique.  Range of  motion from 0 to greater than 120 degrees of flexion.  Knee was  stable to varus and valgus stress testing.  I sized the patella to a  29.  I drilled for the 3 peg holes.  I placed a new trial patella.  Again, I had good range of motion and good patellar tracking using no  thumbs technique.  All trial components were removed.  Wound was  copiously irrigated with normal saline.  I then cemented in my final  size 3 stemmed tibial component, my final size 4 PS femoral  component, my 29 mm all-poly patella as well as also excess cement  was removed.  At this point, I then inserted my 7 mm PS articular  surface.  Once the cement hardened, wound was irrigated with normal  saline.  Again, I had good range of motion from 0 to greater than 120  degrees of flexion.  There was good patellar tracking using the no  thumbs technique.  Arthrotomy was closed with #1 Vicryl.  Tourniquet  was let down after 68 minutes.  Good vascular return seen to the  patient's left lower extremity.  Subcutaneous tissue was closed with  2-0 Vicryl.  Skin was closed with a running 3-0 Monocryl suture.  Silver-impregnated Aquacel dressing was applied.  The patient had an  Ace wrap placed on top of this.  Patient was awakened from anesthesia  and returned to the recovery room in stable condition.  There were no  complications during the case.  Estimated blood loss was under 50 mL.   All sponge and needle counts were correct at the end of the case.  There were no complications during the case.     The patient will be weightbearing as tolerated in left lower  extremity.  She will receive Ancef for antibiotic prophylaxis.  She  will be on aspirin 325 mg b.i.d. for DVT prophylaxis.       Ruben Ontiveros MD    DD:  05/16/2023 09:48:07 EST  DT:  05/16/2023 11:29:21 EST  DICTATION NUMBER:  [id number]  INTERNAL JOB NUMBER:  [id number]             Electronic Signatures:  Ruben Ontiveros) (Signed on 16-May-2023  11:41)   Authored  Unsigned, Draft (SYS GENERATED) (Entered on 16-May-2023 11:29)   Entered    Last Updated: 16-May-2023 11:41 by Ruben Ontiveros)

## 2024-05-09 ENCOUNTER — LAB (OUTPATIENT)
Dept: LAB | Facility: LAB | Age: 79
End: 2024-05-09
Payer: COMMERCIAL

## 2024-05-09 DIAGNOSIS — E78.2 MIXED HYPERLIPIDEMIA: ICD-10-CM

## 2024-05-09 DIAGNOSIS — D72.829 LEUKOCYTOSIS, UNSPECIFIED TYPE: ICD-10-CM

## 2024-05-09 LAB
BASOPHILS # BLD AUTO: 0.04 X10*3/UL (ref 0–0.1)
BASOPHILS NFR BLD AUTO: 0.5 %
CHOLEST SERPL-MCNC: 170 MG/DL (ref 0–199)
CHOLESTEROL/HDL RATIO: 3
EOSINOPHIL # BLD AUTO: 0.32 X10*3/UL (ref 0–0.4)
EOSINOPHIL NFR BLD AUTO: 4 %
ERYTHROCYTE [DISTWIDTH] IN BLOOD BY AUTOMATED COUNT: 13.8 % (ref 11.5–14.5)
HCT VFR BLD AUTO: 37.9 % (ref 36–46)
HDLC SERPL-MCNC: 56.3 MG/DL
HGB BLD-MCNC: 11.7 G/DL (ref 12–16)
IMM GRANULOCYTES # BLD AUTO: 0.03 X10*3/UL (ref 0–0.5)
IMM GRANULOCYTES NFR BLD AUTO: 0.4 % (ref 0–0.9)
LDLC SERPL CALC-MCNC: 91 MG/DL
LYMPHOCYTES # BLD AUTO: 1.69 X10*3/UL (ref 0.8–3)
LYMPHOCYTES NFR BLD AUTO: 21.4 %
MCH RBC QN AUTO: 27.1 PG (ref 26–34)
MCHC RBC AUTO-ENTMCNC: 30.9 G/DL (ref 32–36)
MCV RBC AUTO: 88 FL (ref 80–100)
MONOCYTES # BLD AUTO: 0.62 X10*3/UL (ref 0.05–0.8)
MONOCYTES NFR BLD AUTO: 7.8 %
NEUTROPHILS # BLD AUTO: 5.21 X10*3/UL (ref 1.6–5.5)
NEUTROPHILS NFR BLD AUTO: 65.9 %
NON HDL CHOLESTEROL: 114 MG/DL (ref 0–149)
NRBC BLD-RTO: 0 /100 WBCS (ref 0–0)
PLATELET # BLD AUTO: 309 X10*3/UL (ref 150–450)
RBC # BLD AUTO: 4.32 X10*6/UL (ref 4–5.2)
TRIGL SERPL-MCNC: 114 MG/DL (ref 0–149)
VLDL: 23 MG/DL (ref 0–40)
WBC # BLD AUTO: 7.9 X10*3/UL (ref 4.4–11.3)

## 2024-05-09 PROCEDURE — 36415 COLL VENOUS BLD VENIPUNCTURE: CPT

## 2024-05-09 PROCEDURE — 80061 LIPID PANEL: CPT

## 2024-05-09 PROCEDURE — 85025 COMPLETE CBC W/AUTO DIFF WBC: CPT

## 2024-05-22 ENCOUNTER — PATIENT OUTREACH (OUTPATIENT)
Dept: CARE COORDINATION | Facility: CLINIC | Age: 79
End: 2024-05-22
Payer: COMMERCIAL

## 2024-09-11 ENCOUNTER — APPOINTMENT (OUTPATIENT)
Dept: NEUROLOGY | Facility: CLINIC | Age: 79
End: 2024-09-11
Payer: COMMERCIAL

## 2025-05-05 ENCOUNTER — APPOINTMENT (OUTPATIENT)
Dept: ORTHOPEDIC SURGERY | Facility: CLINIC | Age: 80
End: 2025-05-05
Payer: COMMERCIAL